# Patient Record
Sex: MALE | Race: WHITE | URBAN - METROPOLITAN AREA
[De-identification: names, ages, dates, MRNs, and addresses within clinical notes are randomized per-mention and may not be internally consistent; named-entity substitution may affect disease eponyms.]

---

## 2022-09-19 ENCOUNTER — OFFICE VISIT (OUTPATIENT)
Dept: SURGERY | Age: 37
End: 2022-09-19
Payer: MEDICAID

## 2022-09-19 VITALS
HEIGHT: 75 IN | HEART RATE: 56 BPM | OXYGEN SATURATION: 99 % | WEIGHT: 230 LBS | SYSTOLIC BLOOD PRESSURE: 122 MMHG | BODY MASS INDEX: 28.6 KG/M2 | TEMPERATURE: 97.3 F | DIASTOLIC BLOOD PRESSURE: 61 MMHG

## 2022-09-19 DIAGNOSIS — L72.3 SEBACEOUS CYST: Primary | ICD-10-CM

## 2022-09-19 PROCEDURE — 99203 OFFICE O/P NEW LOW 30 MIN: CPT | Performed by: SURGERY

## 2022-09-19 RX ORDER — CETIRIZINE HCL 10 MG
10 TABLET ORAL
COMMUNITY

## 2022-09-19 NOTE — PROGRESS NOTES
Sia Chen is a 44 y.o. male (: 1985) presenting to address:    Chief Complaint   Patient presents with    New Patient     Scalp mass x 2 and x1 near left eye/ referred by Braeden Diana PA-C       Medication list and allergies have been reviewed with Sia Chen and updated as of today's date. I have gone over all Medical, Surgical and Social History with Farhanjosias Gustavo and updated/added the information accordingly.

## 2022-09-26 ENCOUNTER — OFFICE VISIT (OUTPATIENT)
Dept: SURGERY | Age: 37
End: 2022-09-26
Payer: MEDICAID

## 2022-09-26 VITALS
DIASTOLIC BLOOD PRESSURE: 66 MMHG | TEMPERATURE: 97.8 F | HEART RATE: 53 BPM | OXYGEN SATURATION: 98 % | WEIGHT: 225 LBS | SYSTOLIC BLOOD PRESSURE: 109 MMHG | BODY MASS INDEX: 28.12 KG/M2

## 2022-09-26 DIAGNOSIS — L72.3 SEBACEOUS CYST: Primary | ICD-10-CM

## 2022-09-26 PROCEDURE — 99214 OFFICE O/P EST MOD 30 MIN: CPT | Performed by: SURGERY

## 2022-09-26 NOTE — PROGRESS NOTES
Sheila Smith is a 44 y.o. male (: 1985) presenting to address:    Chief Complaint   Patient presents with    Procedure     Excision of scalp mass       Medication list and allergies have been reviewed with Sheila Smith and updated as of today's date. I have gone over all Medical, Surgical and Social History with Sheila Smith and updated/added the information accordingly. 1. Have you been to the ER, Urgent Care or Hospitalized since your last visit? NO      2. Have you followed up with your PCP or any other Physicians since your procedure/ last office visit?    NO

## 2022-09-26 NOTE — PROGRESS NOTES
General Surgery Consult    Cesar Child  Admit date: (Not on file)    MRN: [id number]     : 1985     Age: 44 y.o. Attending Physician: Susana Ayala MD, [facility]      History of Present Illness:      Cesar Child is a 44 y.o. male who is here today for follow-up on his sebaceous cyst of the scalp. I seen the patient last week and he would like her to have the one located on the upper mid scalp to be removed. He stated that he works as a  and every time he is at his job he hit his head and it had the cyst and he would like it to be removed for that reason mainly. There are no problems to display for this patient. History reviewed. No pertinent past medical history. History reviewed. No pertinent surgical history. Social History     Tobacco Use    Smoking status: Never     Passive exposure: Never    Smokeless tobacco: Never   Substance Use Topics    Alcohol use: Yes     Comment: Occ      Social History     Tobacco Use   Smoking Status Never    Passive exposure: Never   Smokeless Tobacco Never     History reviewed. No pertinent family history. Current Outpatient Medications   Medication Sig    cetirizine (ZYRTEC) 10 mg tablet Take 10 mg by mouth daily as needed. No current facility-administered medications for this visit. No Known Allergies       Review of Systems:  Pertinent items are noted in the History of Present Illness. Objective:     Visit Vitals  /66 (BP 1 Location: Right upper arm, BP Patient Position: Sitting, BP Cuff Size: Large adult)   Pulse (!) 53   Temp 97.8 °F (36.6 °C) (Skin)   Wt 102.1 kg (225 lb)   SpO2 98%   BMI 28.12 kg/m²       Physical Exam:      General:  in no apparent distress, alert, and oriented times 3   Scalp: There is a sebaceous cyst measuring around 1 to 1-1/2 cm located on the upper mid scalp that is nontender with no overlying skin erythema.                                 Imaging and Lab Review:     CBC: No results found for: WBC, RBC, HGB, HCT, PLT, HGBEXT, HCTEXT, PLTEXT  BMP: No results found for: GLU, NA, K, CL, CO2, BUN, CREA, CA  CMP:No results found for: GLU, NA, K, CL, CO2, BUN, CREA, CA, AGAP, BUCR, TBIL, AP, TP, ALB, GLOB, AGRAT    No results found for this or any previous visit (from the past 24 hour(s)). images and reports reviewed    Assessment:   Radha Lovell is a 44 y.o. male who is here today to have his sebaceous cyst of the scalp excised in the office under local anesthesia. The patient understands the risks including bleeding and infection as well as recurrence of the cyst.     Plan:     A consent was taken from the patient and a timeout was performed  The cyst was identified and the area was cleaned with ChloraPrep  1% lidocaine was infiltrated around the cyst and then a skin incision was deepened through the skin and subcutaneous tissue and the cyst was identified. It was a sebaceous cyst that was excised completely and discarded  I put pressure for about 5 minutes with my hand on a gauze and this controlled and oozing.   Then I closed the skin with a 3-0 Vicryl subcuticular suture 2 of them and this was followed by glue  We checked on the patient after 5 minutes of waiting and there he was doing well and he was discharged home after retaking his blood pressure    Please call me if you have any questions (cell phone: [phone])     Signed By: Whitney Thakur MD     September 26, 2022